# Patient Record
Sex: MALE | Race: WHITE | NOT HISPANIC OR LATINO | Employment: UNEMPLOYED | URBAN - METROPOLITAN AREA
[De-identification: names, ages, dates, MRNs, and addresses within clinical notes are randomized per-mention and may not be internally consistent; named-entity substitution may affect disease eponyms.]

---

## 2020-10-09 ENCOUNTER — OFFICE VISIT (OUTPATIENT)
Dept: URGENT CARE | Facility: CLINIC | Age: 45
End: 2020-10-09
Payer: COMMERCIAL

## 2020-10-09 VITALS
WEIGHT: 175 LBS | DIASTOLIC BLOOD PRESSURE: 88 MMHG | OXYGEN SATURATION: 97 % | HEART RATE: 74 BPM | TEMPERATURE: 97.4 F | BODY MASS INDEX: 23.7 KG/M2 | RESPIRATION RATE: 18 BRPM | HEIGHT: 72 IN | SYSTOLIC BLOOD PRESSURE: 151 MMHG

## 2020-10-09 DIAGNOSIS — L03.115 CELLULITIS OF RIGHT LOWER EXTREMITY: Primary | ICD-10-CM

## 2020-10-09 PROCEDURE — G0382 LEV 3 HOSP TYPE B ED VISIT: HCPCS | Performed by: PHYSICIAN ASSISTANT

## 2020-10-09 RX ORDER — SULFAMETHOXAZOLE AND TRIMETHOPRIM 800; 160 MG/1; MG/1
1 TABLET ORAL EVERY 12 HOURS SCHEDULED
Qty: 20 TABLET | Refills: 0 | Status: SHIPPED | OUTPATIENT
Start: 2020-10-09 | End: 2020-10-19

## 2020-10-14 ENCOUNTER — OFFICE VISIT (OUTPATIENT)
Dept: URGENT CARE | Facility: CLINIC | Age: 45
End: 2020-10-14
Payer: COMMERCIAL

## 2020-10-14 VITALS
SYSTOLIC BLOOD PRESSURE: 144 MMHG | TEMPERATURE: 98 F | HEIGHT: 72 IN | DIASTOLIC BLOOD PRESSURE: 97 MMHG | WEIGHT: 172 LBS | RESPIRATION RATE: 18 BRPM | HEART RATE: 73 BPM | OXYGEN SATURATION: 99 % | BODY MASS INDEX: 23.3 KG/M2

## 2020-10-14 DIAGNOSIS — L03.115 CELLULITIS OF RIGHT LOWER EXTREMITY: Primary | ICD-10-CM

## 2020-10-14 PROCEDURE — G0383 LEV 4 HOSP TYPE B ED VISIT: HCPCS | Performed by: FAMILY MEDICINE

## 2020-10-14 RX ORDER — DOXYCYCLINE 100 MG/1
100 TABLET ORAL 2 TIMES DAILY
Qty: 20 TABLET | Refills: 0 | Status: SHIPPED | OUTPATIENT
Start: 2020-10-14 | End: 2020-10-24

## 2022-04-30 ENCOUNTER — OFFICE VISIT (OUTPATIENT)
Dept: URGENT CARE | Facility: CLINIC | Age: 47
End: 2022-04-30
Payer: COMMERCIAL

## 2022-04-30 VITALS
RESPIRATION RATE: 18 BRPM | SYSTOLIC BLOOD PRESSURE: 130 MMHG | DIASTOLIC BLOOD PRESSURE: 88 MMHG | HEIGHT: 72 IN | TEMPERATURE: 98 F | BODY MASS INDEX: 24.38 KG/M2 | OXYGEN SATURATION: 99 % | HEART RATE: 60 BPM | WEIGHT: 180 LBS

## 2022-04-30 DIAGNOSIS — R30.0 DYSURIA: Primary | ICD-10-CM

## 2022-04-30 LAB
SL AMB  POCT GLUCOSE, UA: ABNORMAL
SL AMB LEUKOCYTE ESTERASE,UA: ABNORMAL
SL AMB POCT BILIRUBIN,UA: ABNORMAL
SL AMB POCT BLOOD,UA: ABNORMAL
SL AMB POCT CLARITY,UA: CLEAR
SL AMB POCT COLOR,UA: ABNORMAL
SL AMB POCT KETONES,UA: ABNORMAL
SL AMB POCT NITRITE,UA: ABNORMAL
SL AMB POCT PH,UA: 5
SL AMB POCT SPECIFIC GRAVITY,UA: 1
SL AMB POCT URINE PROTEIN: ABNORMAL
SL AMB POCT UROBILINOGEN: 0.2

## 2022-04-30 PROCEDURE — 81002 URINALYSIS NONAUTO W/O SCOPE: CPT | Performed by: FAMILY MEDICINE

## 2022-04-30 PROCEDURE — G0382 LEV 3 HOSP TYPE B ED VISIT: HCPCS | Performed by: FAMILY MEDICINE

## 2022-04-30 PROCEDURE — 87086 URINE CULTURE/COLONY COUNT: CPT | Performed by: FAMILY MEDICINE

## 2022-04-30 RX ORDER — TAMSULOSIN HYDROCHLORIDE 0.4 MG/1
0.4 CAPSULE ORAL
COMMUNITY
Start: 2022-04-22 | End: 2022-05-02

## 2022-04-30 RX ORDER — SULFAMETHOXAZOLE AND TRIMETHOPRIM 800; 160 MG/1; MG/1
1 TABLET ORAL EVERY 12 HOURS SCHEDULED
Qty: 14 TABLET | Refills: 0 | Status: SHIPPED | OUTPATIENT
Start: 2022-04-30 | End: 2022-05-07

## 2022-04-30 NOTE — PROGRESS NOTES
[facility]        NAME: Chi Gross is a 42 y o  male  : 1975    MRN: [id number]  DATE: 2022  TIME: 1:57 PM    Assessment and Plan   Dysuria [R30 0]  1  Dysuria  POCT urine dip    Urine culture    sulfamethoxazole-trimethoprim (BACTRIM DS) 800-160 mg per tablet         Patient Instructions   Persistent dysuria after passing kidney stones  Small amount of blood/leukocytes on urinalysis  With pain with urination  In light of finding of  Left-sided mild hydronephrosis and hydroureter  On CT scan in the ED will start antibiotic Bactrim ds-take 1 tablet by mouth twice daily for 7 days  Urine culture will be sent out to confirm  Follow-up with Urology in 2 days as scheduled  Tylenol on as needed basis for pain  Continue hydration  May continue azo on as needed basis  Follow up with PCP in 3-5 days  Proceed to  ER if symptoms worsen  Chief Complaint     Chief Complaint   Patient presents with    Possible UTI     burning on urination for 9 days, passed 8 kidney stones 1 week ago         History of Present Illness         Patient presents for evaluation of persistent dysuria and suprapubic discomfort after developing significant left-sided pain in the flank and abdomen on 2020  Patient was evaluated at a [facility] Emergency Room where she was diagnosed with nephrolithiasis  CT scan of the abdomen and pelvis showed mild left hydronephrosis and hydroureter with evidence of stones  Patient was placed on Flomax as well as ibuprofen and Percocet for pain relief  He was able to pass 9 stone since then  Currently he is drinking plenty of water and does not see any gross blood in the urine  This morning he had persistent dysuria and took some over-the-counter azo which helped a little bit only  Otherwise he is taking ibuprofen  No fevers or chills, no nausea or vomiting  No left-sided flank pain or abdominal pain    No abdominal tenderness  Review of Systems   Review of Systems   Constitutional: Negative for chills, diaphoresis and fever  Genitourinary: Positive for dysuria, frequency, penile pain and urgency  Negative for hematuria and penile discharge  Current Medications       Current Outpatient Medications:     sulfamethoxazole-trimethoprim (BACTRIM DS) 800-160 mg per tablet, Take 1 tablet by mouth every 12 (twelve) hours for 7 days, Disp: 14 tablet, Rfl: 0    tamsulosin (FLOMAX) 0 4 mg, Take 0 4 mg by mouth (Patient not taking: Reported on 4/30/2022 ), Disp: , Rfl:     Current Allergies     Allergies as of 04/30/2022    (No Known Allergies)            The following portions of the patient's history were reviewed and updated as appropriate: allergies, current medications, past family history, past medical history, past social history, past surgical history and problem list      History reviewed  No pertinent past medical history  History reviewed  No pertinent surgical history  History reviewed  No pertinent family history  Medications have been verified  Objective   /88   Pulse 60   Temp 98 °F (36 7 °C) (Temporal)   Resp 18   Ht 6' (1 829 m)   Wt 81 6 kg (180 lb)   SpO2 99%   BMI 24 41 kg/m²   No LMP for male patient  Physical Exam     Physical Exam  Constitutional:       General: He is not in acute distress  Appearance: Normal appearance  He is not toxic-appearing  Cardiovascular:      Rate and Rhythm: Normal rate and regular rhythm  Heart sounds: Normal heart sounds  Pulmonary:      Effort: Pulmonary effort is normal       Breath sounds: Normal breath sounds  No wheezing or rhonchi  Abdominal:      General: Abdomen is flat  Tenderness: There is no abdominal tenderness  There is no right CVA tenderness or left CVA tenderness  Skin:     General: Skin is warm and dry  Coloration: Skin is not jaundiced  Neurological:      Mental Status: He is alert  Urinalysis with orange color, small blood, trace leukocytes  Negative nitrites

## 2022-04-30 NOTE — PATIENT INSTRUCTIONS
Persistent dysuria after passing kidney stones  Small amount of blood/leukocytes on urinalysis  With pain with urination  In light of finding of  Left-sided mild hydronephrosis and hydroureter  On CT scan in the ED will start antibiotic Bactrim ds-take 1 tablet by mouth twice daily for 7 days  Urine culture will be sent out to confirm  Follow-up with Urology in 2 days as scheduled  Tylenol on as needed basis for pain  Continue hydration  May continue azo on as needed basis

## 2022-05-02 LAB — BACTERIA UR CULT: NORMAL

## 2023-08-22 ENCOUNTER — OFFICE VISIT (OUTPATIENT)
Dept: INTERNAL MEDICINE CLINIC | Facility: OTHER | Age: 48
End: 2023-08-22
Payer: COMMERCIAL

## 2023-08-22 VITALS
HEIGHT: 73 IN | SYSTOLIC BLOOD PRESSURE: 138 MMHG | WEIGHT: 176 LBS | DIASTOLIC BLOOD PRESSURE: 80 MMHG | RESPIRATION RATE: 20 BRPM | OXYGEN SATURATION: 97 % | HEART RATE: 82 BPM | BODY MASS INDEX: 23.33 KG/M2 | TEMPERATURE: 98.6 F

## 2023-08-22 DIAGNOSIS — K40.20 NON-RECURRENT BILATERAL INGUINAL HERNIA WITHOUT OBSTRUCTION OR GANGRENE: Primary | ICD-10-CM

## 2023-08-22 DIAGNOSIS — R03.0 ELEVATED BP WITHOUT DIAGNOSIS OF HYPERTENSION: ICD-10-CM

## 2023-08-22 DIAGNOSIS — Z13.228 SCREENING FOR METABOLIC DISORDER: ICD-10-CM

## 2023-08-22 PROCEDURE — 99202 OFFICE O/P NEW SF 15 MIN: CPT | Performed by: NURSE PRACTITIONER

## 2023-08-22 NOTE — ASSESSMENT & PLAN NOTE
Continue to monitor blood pressure at home. Goal BP is < 130/80. Contact our office for consistent elevations. Recommend low sodium diet. Exercise 30 minutes 5 times a week as tolerated.   Recommend yearly eye exam.

## 2023-08-22 NOTE — PROGRESS NOTES
Assessment/Plan:    Non-recurrent bilateral inguinal hernia without obstruction or gangrene  Referral to surgery    Screening for metabolic disorder  Will get fasting blood work    Elevated BP without diagnosis of hypertension    Continue to monitor blood pressure at home. Goal BP is < 130/80. Contact our office for consistent elevations. Recommend low sodium diet. Exercise 30 minutes 5 times a week as tolerated. Recommend yearly eye exam.                Diagnoses and all orders for this visit:    Non-recurrent bilateral inguinal hernia without obstruction or gangrene  -     Ambulatory Referral to General Surgery; Future    Screening for metabolic disorder  -     CBC and differential  -     Comprehensive metabolic panel; Future  -     Lipid panel    Elevated BP without diagnosis of hypertension          Subjective:      Patient ID: Alivia Streeter is a 57 y.o. male. Patient presents a new patient today for concerns for groin pain. He reports about 2 months ago he felt pain to his left groin denies any particular injury that he can recall. He is unable to run or work out due to the discomfort. He reports pain behind his belly button as well. Hurts worse with dipping motion when working out   Started to notice a lump     He reports that as an infant he had a double hernia with repair       Previous CT from 2022  1.   There are no findings of right renal calculus, hydronephrosis or   hydroureter. There is mild left hydronephrosis and hydroureter due to left   ureterovesical junction obstructing Steinstrasse with the largest left   ureterovesical nonobstructing calculus measuring 1.4 mm. There are multiple   nonobstructing left renal calculi with the largest nonobstructing left renal   calculus at the lower pole measuring 1.8 mm. The enlarged prostate gland   measures 5.8 x 4.4 cm.  There is distended urinary bladder representing findings   of urinary bladder outlet obstruction related to enlarged prostate gland which   can be better evaluated with cystoscopy . 2. Che Caper is mucosal wall thickening of the visualized esophagus. The small and   large bowel loops are normal in caliber, and, evaluation of the gastrointestinal   tract is limited due to lack of oral contrast. The appendix is normal. There is   right inguinal hernia containing fat, portion of cecum and small bowel loops   within the hernial sac without findings of interstitial obstruction. 3.  The fat-containing left inguinal hernia measures 2.6 x 2.4 cm. The   fat-containing umbilical hernia measures 1.0 x 0.6 cm.   4.  There are atherosclerotic aortic and vascular calcifications. The following portions of the patient's history were reviewed and updated as appropriate: allergies, current medications, past family history, past medical history, past social history, past surgical history and problem list.    Review of Systems   Constitutional: Negative for activity change, appetite change, chills, diaphoresis and fever. HENT: Negative for congestion, ear discharge, ear pain, postnasal drip, rhinorrhea, sinus pressure, sinus pain and sore throat. Eyes: Negative for pain, discharge, itching and visual disturbance. Respiratory: Negative for cough, chest tightness, shortness of breath and wheezing. Cardiovascular: Negative for chest pain, palpitations and leg swelling. Gastrointestinal: Negative for abdominal pain, constipation, diarrhea, nausea and vomiting. Endocrine: Negative for polydipsia, polyphagia and polyuria. Genitourinary: Negative for difficulty urinating, dysuria and urgency. Musculoskeletal: Negative for arthralgias, back pain and neck pain. Skin: Negative for rash and wound. Neurological: Negative for dizziness, weakness, numbness and headaches.          Past Medical History:   Diagnosis Date   • Kidney stone          Current Outpatient Medications:   •  tamsulosin (FLOMAX) 0.4 mg, Take 0.4 mg by mouth (Patient not taking: Reported on 4/30/2022 ), Disp: , Rfl:     No Known Allergies    Social History   Past Surgical History:   Procedure Laterality Date   • HERNIA REPAIR      as an infant but does not know were     Family History   Problem Relation Age of Onset   • Liver cancer Mother    • Diabetes Cousin        Objective:  /80 (BP Location: Left arm, Patient Position: Sitting, Cuff Size: Standard)   Pulse 82   Temp 98.6 °F (37 °C) (Temporal)   Resp 20   Ht 6' 0.5" (1.842 m)   Wt 79.8 kg (176 lb)   SpO2 97%   BMI 23.54 kg/m²     No results found for this or any previous visit (from the past 1344 hour(s)). Physical Exam  Exam conducted with a chaperone present (exam perfromed by Dr. Chantal Donato ). Constitutional:       General: He is not in acute distress. Appearance: He is well-developed. He is not diaphoretic. HENT:      Head: Normocephalic and atraumatic. Right Ear: External ear normal.      Left Ear: External ear normal.      Nose: Nose normal.      Mouth/Throat:      Pharynx: No oropharyngeal exudate. Eyes:      General:         Right eye: No discharge. Left eye: No discharge. Conjunctiva/sclera: Conjunctivae normal.      Pupils: Pupils are equal, round, and reactive to light. Neck:      Thyroid: No thyromegaly. Cardiovascular:      Rate and Rhythm: Normal rate and regular rhythm. Heart sounds: Normal heart sounds. No murmur heard. No friction rub. No gallop. Pulmonary:      Effort: Pulmonary effort is normal. No respiratory distress. Breath sounds: Normal breath sounds. No stridor. No wheezing or rales. Abdominal:      General: Bowel sounds are normal. There is no distension. Palpations: Abdomen is soft. Tenderness: There is no abdominal tenderness. Hernia: A hernia is present. Hernia is present in the left inguinal area. Musculoskeletal:      Cervical back: Normal range of motion and neck supple.    Lymphadenopathy: Cervical: No cervical adenopathy. Skin:     General: Skin is warm and dry. Findings: No erythema or rash. Neurological:      Mental Status: He is alert and oriented to person, place, and time. Psychiatric:         Behavior: Behavior normal.         Thought Content:  Thought content normal.         Judgment: Judgment normal.

## 2023-09-05 ENCOUNTER — CONSULT (OUTPATIENT)
Dept: SURGERY | Facility: CLINIC | Age: 48
End: 2023-09-05
Payer: COMMERCIAL

## 2023-09-05 VITALS
WEIGHT: 175 LBS | SYSTOLIC BLOOD PRESSURE: 140 MMHG | HEIGHT: 72 IN | DIASTOLIC BLOOD PRESSURE: 88 MMHG | BODY MASS INDEX: 23.7 KG/M2 | HEART RATE: 81 BPM

## 2023-09-05 DIAGNOSIS — K40.20 NON-RECURRENT BILATERAL INGUINAL HERNIA WITHOUT OBSTRUCTION OR GANGRENE: ICD-10-CM

## 2023-09-05 PROCEDURE — 99203 OFFICE O/P NEW LOW 30 MIN: CPT | Performed by: SURGERY

## 2023-09-05 NOTE — PROGRESS NOTES
Assessment/Plan: Patient had CAT scan imaging last year demonstrating bilateral inguinal as well as umbilical herniations. He did have a history of bilateral inguinal hernia repair as an infant. Recently it is the left inguinal area that has become uncomfortable. He has had difficulty with activity over the last 2 months. His pain is daily. It does alter his exercise regimen. He states that he is busy daily either going to the gym or being active at work. He enjoys workouts as well as running regularly. Initially we discussed robotic assisted laparoscopic bilateral inguinal hernia repair. We discussed that the recurrence rate of 5%. An open hernia repair rate is 1% or less. After discussion, his preference is to repair the left inguinal symptomatic hernia in an open fashion as he has concerns with his active lifestyle of recurrence. Risks and benefits of surgery discussed in detail. All questions answered. The umbilical and right inguinal regions are presently asymptomatic. Diagnoses and all orders for this visit:    Non-recurrent bilateral inguinal hernia without obstruction or gangrene  -     Ambulatory Referral to General Surgery        Subjective:      Patient ID: German Moyer is a 57 y.o. male. Patient presents for bilateral inguinal hernias and umbilical hernia consult. States he has had bulges RLQ, LLQ and at his umbilicus for 2 months. He has dull achy pain LLQ. Limits his activities. CTA abdomen pelvis 4/22/2022. IMPRESSION:   1.   There are no findings of right renal calculus, hydronephrosis or   hydroureter. There is mild left hydronephrosis and hydroureter due to left   ureterovesical junction obstructing Steinstrasse with the largest left   ureterovesical nonobstructing calculus measuring 1.4 mm. There are multiple   nonobstructing left renal calculi with the largest nonobstructing left renal   calculus at the lower pole measuring 1.8 mm.  The enlarged prostate gland measures 5.8 x 4.4 cm. There is distended urinary bladder representing findings   of urinary bladder outlet obstruction related to enlarged prostate gland which   can be better evaluated with cystoscopy . 2. Darlean Pouch is mucosal wall thickening of the visualized esophagus. The small and   large bowel loops are normal in caliber, and, evaluation of the gastrointestinal   tract is limited due to lack of oral contrast. The appendix is normal. There is   right inguinal hernia containing fat, portion of cecum and small bowel loops   within the hernial sac without findings of interstitial obstruction. 3.  The fat-containing left inguinal hernia measures 2.6 x 2.4 cm. The   fat-containing umbilical hernia measures 1.0 x 0.6 cm.   4.  There are atherosclerotic aortic and vascular calcifications. The following portions of the patient's history were reviewed and updated as appropriate:     He  has a past medical history of Kidney stone. He  has a past surgical history that includes Hernia repair. His family history includes Diabetes in his cousin; Liver cancer in his mother. He  reports that he quit smoking about 18 months ago. His smoking use included cigarettes. He has never used smokeless tobacco. He reports current alcohol use. He reports current drug use. Drug: Marijuana. Current Outpatient Medications   Medication Sig Dispense Refill   • tamsulosin (FLOMAX) 0.4 mg Take 0.4 mg by mouth (Patient not taking: Reported on 4/30/2022 )       No current facility-administered medications for this visit. He has No Known Allergies. .    Review of Systems   Constitutional: Negative. Negative for activity change. HENT: Negative. Eyes: Negative. Respiratory: Negative. Cardiovascular: Negative. Gastrointestinal: Positive for abdominal pain. Endocrine: Negative. Genitourinary: Negative. Musculoskeletal: Negative. Skin: Negative. Allergic/Immunologic: Negative. Neurological: Negative. Psychiatric/Behavioral: Negative for agitation, behavioral problems and confusion. The patient is not nervous/anxious. Objective:      /88   Pulse 81   Ht 6' (1.829 m)   Wt 79.4 kg (175 lb)   BMI 23.73 kg/m²          Physical Exam  Constitutional:       Appearance: He is well-developed. He is not diaphoretic. HENT:      Head: Normocephalic and atraumatic. Eyes:      General: No scleral icterus. Right eye: No discharge. Left eye: No discharge. Extraocular Movements: Extraocular movements intact. Conjunctiva/sclera: Conjunctivae normal.   Neck:      Thyroid: No thyromegaly. Trachea: No tracheal deviation. Cardiovascular:      Rate and Rhythm: Normal rate and regular rhythm. Heart sounds: Normal heart sounds. No murmur heard. No friction rub. Pulmonary:      Effort: Pulmonary effort is normal. No respiratory distress. Breath sounds: Normal breath sounds. No stridor. No wheezing. Chest:      Chest wall: No tenderness. Abdominal:      General: There is no distension. Palpations: There is no mass. Tenderness: There is no abdominal tenderness. There is no guarding or rebound. Hernia: A hernia (  Left and right inguinal herniations. These are reducible. Very small umbilical hernia to deep palpation.) is present. Musculoskeletal:         General: No tenderness. Right lower leg: No edema. Left lower leg: No edema. Lymphadenopathy:      Cervical: No cervical adenopathy. Skin:     General: Skin is warm and dry. Findings: No erythema or rash. Neurological:      Mental Status: He is alert and oriented to person, place, and time. Cranial Nerves: No cranial nerve deficit.       Coordination: Coordination normal.   Psychiatric:         Behavior: Behavior normal.         Judgment: Judgment normal.

## 2023-09-05 NOTE — H&P (VIEW-ONLY)
Assessment/Plan: Patient had CAT scan imaging last year demonstrating bilateral inguinal as well as umbilical herniations. He did have a history of bilateral inguinal hernia repair as an infant. Recently it is the left inguinal area that has become uncomfortable. He has had difficulty with activity over the last 2 months. His pain is daily. It does alter his exercise regimen. He states that he is busy daily either going to the gym or being active at work. He enjoys workouts as well as running regularly. Initially we discussed robotic assisted laparoscopic bilateral inguinal hernia repair. We discussed that the recurrence rate of 5%. An open hernia repair rate is 1% or less. After discussion, his preference is to repair the left inguinal symptomatic hernia in an open fashion as he has concerns with his active lifestyle of recurrence. Risks and benefits of surgery discussed in detail. All questions answered. The umbilical and right inguinal regions are presently asymptomatic. Diagnoses and all orders for this visit:    Non-recurrent bilateral inguinal hernia without obstruction or gangrene  -     Ambulatory Referral to General Surgery        Subjective:      Patient ID: Kayla Milligan is a 57 y.o. male. Patient presents for bilateral inguinal hernias and umbilical hernia consult. States he has had bulges RLQ, LLQ and at his umbilicus for 2 months. He has dull achy pain LLQ. Limits his activities. CTA abdomen pelvis 4/22/2022. IMPRESSION:   1.   There are no findings of right renal calculus, hydronephrosis or   hydroureter. There is mild left hydronephrosis and hydroureter due to left   ureterovesical junction obstructing Steinstrasse with the largest left   ureterovesical nonobstructing calculus measuring 1.4 mm. There are multiple   nonobstructing left renal calculi with the largest nonobstructing left renal   calculus at the lower pole measuring 1.8 mm.  The enlarged prostate gland measures 5.8 x 4.4 cm. There is distended urinary bladder representing findings   of urinary bladder outlet obstruction related to enlarged prostate gland which   can be better evaluated with cystoscopy . 2. Annamae Heater is mucosal wall thickening of the visualized esophagus. The small and   large bowel loops are normal in caliber, and, evaluation of the gastrointestinal   tract is limited due to lack of oral contrast. The appendix is normal. There is   right inguinal hernia containing fat, portion of cecum and small bowel loops   within the hernial sac without findings of interstitial obstruction. 3.  The fat-containing left inguinal hernia measures 2.6 x 2.4 cm. The   fat-containing umbilical hernia measures 1.0 x 0.6 cm.   4.  There are atherosclerotic aortic and vascular calcifications. The following portions of the patient's history were reviewed and updated as appropriate:     He  has a past medical history of Kidney stone. He  has a past surgical history that includes Hernia repair. His family history includes Diabetes in his cousin; Liver cancer in his mother. He  reports that he quit smoking about 18 months ago. His smoking use included cigarettes. He has never used smokeless tobacco. He reports current alcohol use. He reports current drug use. Drug: Marijuana. Current Outpatient Medications   Medication Sig Dispense Refill   • tamsulosin (FLOMAX) 0.4 mg Take 0.4 mg by mouth (Patient not taking: Reported on 4/30/2022 )       No current facility-administered medications for this visit. He has No Known Allergies. .    Review of Systems   Constitutional: Negative. Negative for activity change. HENT: Negative. Eyes: Negative. Respiratory: Negative. Cardiovascular: Negative. Gastrointestinal: Positive for abdominal pain. Endocrine: Negative. Genitourinary: Negative. Musculoskeletal: Negative. Skin: Negative. Allergic/Immunologic: Negative. Neurological: Negative. Psychiatric/Behavioral: Negative for agitation, behavioral problems and confusion. The patient is not nervous/anxious. Objective:      /88   Pulse 81   Ht 6' (1.829 m)   Wt 79.4 kg (175 lb)   BMI 23.73 kg/m²          Physical Exam  Constitutional:       Appearance: He is well-developed. He is not diaphoretic. HENT:      Head: Normocephalic and atraumatic. Eyes:      General: No scleral icterus. Right eye: No discharge. Left eye: No discharge. Extraocular Movements: Extraocular movements intact. Conjunctiva/sclera: Conjunctivae normal.   Neck:      Thyroid: No thyromegaly. Trachea: No tracheal deviation. Cardiovascular:      Rate and Rhythm: Normal rate and regular rhythm. Heart sounds: Normal heart sounds. No murmur heard. No friction rub. Pulmonary:      Effort: Pulmonary effort is normal. No respiratory distress. Breath sounds: Normal breath sounds. No stridor. No wheezing. Chest:      Chest wall: No tenderness. Abdominal:      General: There is no distension. Palpations: There is no mass. Tenderness: There is no abdominal tenderness. There is no guarding or rebound. Hernia: A hernia (  Left and right inguinal herniations. These are reducible. Very small umbilical hernia to deep palpation.) is present. Musculoskeletal:         General: No tenderness. Right lower leg: No edema. Left lower leg: No edema. Lymphadenopathy:      Cervical: No cervical adenopathy. Skin:     General: Skin is warm and dry. Findings: No erythema or rash. Neurological:      Mental Status: He is alert and oriented to person, place, and time. Cranial Nerves: No cranial nerve deficit.       Coordination: Coordination normal.   Psychiatric:         Behavior: Behavior normal.         Judgment: Judgment normal.

## 2023-09-08 ENCOUNTER — ANESTHESIA EVENT (OUTPATIENT)
Dept: PERIOP | Facility: AMBULARY SURGERY CENTER | Age: 48
End: 2023-09-08
Payer: COMMERCIAL

## 2023-09-18 NOTE — PRE-PROCEDURE INSTRUCTIONS
No outpatient medications have been marked as taking for the 9/21/23 encounter [facility] HOSPITAL Encounter). Spoke with pt via phone. Medication instructions for day surgery reviewed. Please use only a sip of water to take your instructed medications. Avoid all over the counter vitamins, supplements and NSAIDS for one week prior to surgery per anesthesia guidelines. Tylenol is ok to take as needed. You will receive a call one business day prior to surgery with an arrival time and hospital directions. If your surgery is scheduled on a Monday, the hospital will be calling you on the Friday prior to your surgery. If you have not heard from anyone by 8pm, please call the hospital supervisor through the hospital  at [phone]. Jyoti Keller [phone]). Do not eat or drink anything after midnight the night before your surgery, including candy, mints, lifesavers, or chewing gum. Do not drink alcohol 24hrs before your surgery. Try not to smoke at least 24hrs before your surgery. Follow the pre surgery showering instructions as listed in the [facility] Surgical Experience Booklet” or otherwise provided by your surgeon's office. Do not shave the surgical area 24 hours before surgery. Do not apply any lotions, creams, including makeup, cologne, deodorant, or perfumes after showering on the day of your surgery. No contact lenses, eye make-up, or artificial eyelashes. Remove nail polish, including gel polish, and any artificial, gel, or acrylic nails if possible. Remove all jewelry including rings and body piercing jewelry. Wear causal clothing that is easy to take on and off. Consider your type of surgery. Keep any valuables, jewelry, piercings at home. Please bring any specially ordered equipment (sling, braces) if indicated. Arrange for a responsible person to drive you to and from the hospital on the day of your surgery. Visitor Guidelines discussed.      Call the surgeon's office with any new illnesses, exposures, or additional questions prior to surgery. Please reference your [facility] Surgical Experience Booklet” for additional information to prepare for your upcoming surgery.

## 2023-09-21 ENCOUNTER — ANESTHESIA (OUTPATIENT)
Dept: PERIOP | Facility: AMBULARY SURGERY CENTER | Age: 48
End: 2023-09-21
Payer: COMMERCIAL

## 2023-09-21 ENCOUNTER — HOSPITAL ENCOUNTER (OUTPATIENT)
Facility: AMBULARY SURGERY CENTER | Age: 48
Setting detail: OUTPATIENT SURGERY
Discharge: HOME | End: 2023-09-21
Attending: SURGERY | Admitting: SURGERY
Payer: COMMERCIAL

## 2023-09-21 VITALS
BODY MASS INDEX: 23.7 KG/M2 | DIASTOLIC BLOOD PRESSURE: 73 MMHG | OXYGEN SATURATION: 95 % | HEIGHT: 72 IN | WEIGHT: 175 LBS | TEMPERATURE: 97 F | HEART RATE: 58 BPM | RESPIRATION RATE: 16 BRPM | SYSTOLIC BLOOD PRESSURE: 129 MMHG

## 2023-09-21 DIAGNOSIS — K40.20 NON-RECURRENT BILATERAL INGUINAL HERNIA WITHOUT OBSTRUCTION OR GANGRENE: Primary | ICD-10-CM

## 2023-09-21 PROBLEM — F17.200 SMOKER: Status: ACTIVE | Noted: 2023-09-21

## 2023-09-21 PROCEDURE — 49505 PRP I/HERN INIT REDUC >5 YR: CPT | Performed by: SURGERY

## 2023-09-21 PROCEDURE — C1781 MESH (IMPLANTABLE): HCPCS | Performed by: SURGERY

## 2023-09-21 DEVICE — MODIFIED ONFLEX MESH
Type: IMPLANTABLE DEVICE | Site: INGUINAL | Status: FUNCTIONAL
Brand: MODIFIED ONFLEX MESH

## 2023-09-21 RX ORDER — FENTANYL CITRATE/PF 50 MCG/ML
25 SYRINGE (ML) INJECTION
Status: COMPLETED | OUTPATIENT
Start: 2023-09-21 | End: 2023-09-21

## 2023-09-21 RX ORDER — OXYCODONE HYDROCHLORIDE AND ACETAMINOPHEN 5; 325 MG/1; MG/1
1 TABLET ORAL EVERY 4 HOURS PRN
Qty: 10 TABLET | Refills: 0 | Status: SHIPPED | OUTPATIENT
Start: 2023-09-21

## 2023-09-21 RX ORDER — HYDROMORPHONE HCL/PF 1 MG/ML
0.5 SYRINGE (ML) INJECTION
Status: DISCONTINUED | OUTPATIENT
Start: 2023-09-21 | End: 2023-09-21 | Stop reason: HOSPADM

## 2023-09-21 RX ORDER — ONDANSETRON 2 MG/ML
4 INJECTION INTRAMUSCULAR; INTRAVENOUS ONCE AS NEEDED
Status: DISCONTINUED | OUTPATIENT
Start: 2023-09-21 | End: 2023-09-21 | Stop reason: HOSPADM

## 2023-09-21 RX ORDER — BUPIVACAINE HYDROCHLORIDE 2.5 MG/ML
INJECTION, SOLUTION EPIDURAL; INFILTRATION; INTRACAUDAL AS NEEDED
Status: DISCONTINUED | OUTPATIENT
Start: 2023-09-21 | End: 2023-09-21 | Stop reason: HOSPADM

## 2023-09-21 RX ORDER — FENTANYL CITRATE 50 UG/ML
INJECTION, SOLUTION INTRAMUSCULAR; INTRAVENOUS AS NEEDED
Status: DISCONTINUED | OUTPATIENT
Start: 2023-09-21 | End: 2023-09-21

## 2023-09-21 RX ORDER — OXYCODONE HYDROCHLORIDE AND ACETAMINOPHEN 5; 325 MG/1; MG/1
1 TABLET ORAL EVERY 4 HOURS PRN
Status: DISCONTINUED | OUTPATIENT
Start: 2023-09-21 | End: 2023-09-21 | Stop reason: HOSPADM

## 2023-09-21 RX ORDER — SUCCINYLCHOLINE/SOD CL,ISO/PF 100 MG/5ML
SYRINGE (ML) INTRAVENOUS AS NEEDED
Status: DISCONTINUED | OUTPATIENT
Start: 2023-09-21 | End: 2023-09-21

## 2023-09-21 RX ORDER — KETOROLAC TROMETHAMINE 30 MG/ML
INJECTION, SOLUTION INTRAMUSCULAR; INTRAVENOUS AS NEEDED
Status: DISCONTINUED | OUTPATIENT
Start: 2023-09-21 | End: 2023-09-21

## 2023-09-21 RX ORDER — SODIUM CHLORIDE, SODIUM LACTATE, POTASSIUM CHLORIDE, CALCIUM CHLORIDE 600; 310; 30; 20 MG/100ML; MG/100ML; MG/100ML; MG/100ML
125 INJECTION, SOLUTION INTRAVENOUS CONTINUOUS
Status: DISCONTINUED | OUTPATIENT
Start: 2023-09-21 | End: 2023-09-21 | Stop reason: HOSPADM

## 2023-09-21 RX ORDER — ONDANSETRON 2 MG/ML
INJECTION INTRAMUSCULAR; INTRAVENOUS AS NEEDED
Status: DISCONTINUED | OUTPATIENT
Start: 2023-09-21 | End: 2023-09-21

## 2023-09-21 RX ORDER — PROPOFOL 10 MG/ML
INJECTION, EMULSION INTRAVENOUS AS NEEDED
Status: DISCONTINUED | OUTPATIENT
Start: 2023-09-21 | End: 2023-09-21

## 2023-09-21 RX ORDER — ONDANSETRON 2 MG/ML
4 INJECTION INTRAMUSCULAR; INTRAVENOUS EVERY 4 HOURS PRN
Status: DISCONTINUED | OUTPATIENT
Start: 2023-09-21 | End: 2023-09-21 | Stop reason: HOSPADM

## 2023-09-21 RX ORDER — LIDOCAINE HYDROCHLORIDE 10 MG/ML
INJECTION, SOLUTION EPIDURAL; INFILTRATION; INTRACAUDAL; PERINEURAL AS NEEDED
Status: DISCONTINUED | OUTPATIENT
Start: 2023-09-21 | End: 2023-09-21

## 2023-09-21 RX ORDER — KETAMINE HCL IN NACL, ISO-OSM 100MG/10ML
SYRINGE (ML) INJECTION AS NEEDED
Status: DISCONTINUED | OUTPATIENT
Start: 2023-09-21 | End: 2023-09-21

## 2023-09-21 RX ORDER — DEXAMETHASONE SODIUM PHOSPHATE 10 MG/ML
INJECTION, SOLUTION INTRAMUSCULAR; INTRAVENOUS AS NEEDED
Status: DISCONTINUED | OUTPATIENT
Start: 2023-09-21 | End: 2023-09-21

## 2023-09-21 RX ORDER — GLYCOPYRROLATE 0.2 MG/ML
INJECTION INTRAMUSCULAR; INTRAVENOUS AS NEEDED
Status: DISCONTINUED | OUTPATIENT
Start: 2023-09-21 | End: 2023-09-21

## 2023-09-21 RX ORDER — CEFAZOLIN SODIUM 2 G/50ML
2000 SOLUTION INTRAVENOUS ONCE
Status: COMPLETED | OUTPATIENT
Start: 2023-09-21 | End: 2023-09-21

## 2023-09-21 RX ORDER — MAGNESIUM HYDROXIDE 1200 MG/15ML
LIQUID ORAL AS NEEDED
Status: DISCONTINUED | OUTPATIENT
Start: 2023-09-21 | End: 2023-09-21 | Stop reason: HOSPADM

## 2023-09-21 RX ORDER — SODIUM CHLORIDE, SODIUM LACTATE, POTASSIUM CHLORIDE, CALCIUM CHLORIDE 600; 310; 30; 20 MG/100ML; MG/100ML; MG/100ML; MG/100ML
INJECTION, SOLUTION INTRAVENOUS CONTINUOUS PRN
Status: DISCONTINUED | OUTPATIENT
Start: 2023-09-21 | End: 2023-09-21

## 2023-09-21 RX ORDER — MIDAZOLAM HYDROCHLORIDE 2 MG/2ML
INJECTION, SOLUTION INTRAMUSCULAR; INTRAVENOUS AS NEEDED
Status: DISCONTINUED | OUTPATIENT
Start: 2023-09-21 | End: 2023-09-21

## 2023-09-21 RX ORDER — FUROSEMIDE 10 MG/ML
INJECTION INTRAMUSCULAR; INTRAVENOUS AS NEEDED
Status: DISCONTINUED | OUTPATIENT
Start: 2023-09-21 | End: 2023-09-21

## 2023-09-21 RX ORDER — ACETAMINOPHEN 325 MG/1
650 TABLET ORAL EVERY 4 HOURS PRN
Status: DISCONTINUED | OUTPATIENT
Start: 2023-09-21 | End: 2023-09-21 | Stop reason: HOSPADM

## 2023-09-21 RX ADMIN — SODIUM CHLORIDE, SODIUM LACTATE, POTASSIUM CHLORIDE, AND CALCIUM CHLORIDE: .6; .31; .03; .02 INJECTION, SOLUTION INTRAVENOUS at 12:23

## 2023-09-21 RX ADMIN — FENTANYL CITRATE 25 MCG: 50 INJECTION INTRAMUSCULAR; INTRAVENOUS at 13:46

## 2023-09-21 RX ADMIN — CEFAZOLIN SODIUM 2000 MG: 2 SOLUTION INTRAVENOUS at 11:30

## 2023-09-21 RX ADMIN — SODIUM CHLORIDE, SODIUM LACTATE, POTASSIUM CHLORIDE, AND CALCIUM CHLORIDE: .6; .31; .03; .02 INJECTION, SOLUTION INTRAVENOUS at 11:08

## 2023-09-21 RX ADMIN — FENTANYL CITRATE 25 MCG: 50 INJECTION INTRAMUSCULAR; INTRAVENOUS at 13:41

## 2023-09-21 RX ADMIN — FENTANYL CITRATE 25 MCG: 50 INJECTION INTRAMUSCULAR; INTRAVENOUS at 11:58

## 2023-09-21 RX ADMIN — FENTANYL CITRATE 25 MCG: 50 INJECTION INTRAMUSCULAR; INTRAVENOUS at 13:51

## 2023-09-21 RX ADMIN — PROPOFOL 100 MG: 10 INJECTION, EMULSION INTRAVENOUS at 13:00

## 2023-09-21 RX ADMIN — FENTANYL CITRATE 50 MCG: 50 INJECTION INTRAMUSCULAR; INTRAVENOUS at 11:34

## 2023-09-21 RX ADMIN — Medication 30 MG: at 11:43

## 2023-09-21 RX ADMIN — FENTANYL CITRATE 25 MCG: 50 INJECTION INTRAMUSCULAR; INTRAVENOUS at 13:36

## 2023-09-21 RX ADMIN — MIDAZOLAM 2 MG: 1 INJECTION INTRAMUSCULAR; INTRAVENOUS at 11:30

## 2023-09-21 RX ADMIN — KETOROLAC TROMETHAMINE 15 MG: 30 INJECTION, SOLUTION INTRAMUSCULAR at 12:58

## 2023-09-21 RX ADMIN — OXYCODONE HYDROCHLORIDE AND ACETAMINOPHEN 1 TABLET: 5; 325 TABLET ORAL at 14:42

## 2023-09-21 RX ADMIN — DEXAMETHASONE SODIUM PHOSPHATE 10 MG: 10 INJECTION, SOLUTION INTRAMUSCULAR; INTRAVENOUS at 11:40

## 2023-09-21 RX ADMIN — FENTANYL CITRATE 50 MCG: 50 INJECTION INTRAMUSCULAR; INTRAVENOUS at 11:36

## 2023-09-21 RX ADMIN — FENTANYL CITRATE 25 MCG: 50 INJECTION INTRAMUSCULAR; INTRAVENOUS at 12:02

## 2023-09-21 RX ADMIN — FENTANYL CITRATE 25 MCG: 50 INJECTION INTRAMUSCULAR; INTRAVENOUS at 11:52

## 2023-09-21 RX ADMIN — Medication 100 MG: at 12:47

## 2023-09-21 RX ADMIN — ONDANSETRON 4 MG: 2 INJECTION INTRAMUSCULAR; INTRAVENOUS at 12:39

## 2023-09-21 RX ADMIN — LIDOCAINE HYDROCHLORIDE 50 MG: 10 INJECTION, SOLUTION EPIDURAL; INFILTRATION; INTRACAUDAL; PERINEURAL at 11:36

## 2023-09-21 RX ADMIN — Medication 20 MG: at 11:58

## 2023-09-21 RX ADMIN — GLYCOPYRROLATE 0.2 MCG: 0.2 INJECTION INTRAMUSCULAR; INTRAVENOUS at 11:40

## 2023-09-21 RX ADMIN — PROPOFOL 250 MG: 10 INJECTION, EMULSION INTRAVENOUS at 11:36

## 2023-09-21 RX ADMIN — FUROSEMIDE 5 MG: 10 INJECTION, SOLUTION INTRAMUSCULAR; INTRAVENOUS at 12:40

## 2023-09-21 RX ADMIN — PROPOFOL 100 MG: 10 INJECTION, EMULSION INTRAVENOUS at 12:47

## 2023-09-21 RX ADMIN — FENTANYL CITRATE 25 MCG: 50 INJECTION INTRAMUSCULAR; INTRAVENOUS at 11:55

## 2023-09-21 NOTE — INTERVAL H&P NOTE
H&P reviewed. After examining the patient I find no changes in the patients condition since the H&P had been written.     Vitals:    09/21/23 0926   BP: 122/75   Pulse: 62   Resp: 18   Temp: 97.6 °F (36.4 °C)   SpO2: 96%

## 2023-09-21 NOTE — OP NOTE
OPERATIVE REPORT  PATIENT NAME: Cindy Diaz :  1975  MRN: [id number]  Pt Location: [facility] OR ROOM 05    SURGERY DATE: 2023    Surgeon(s) and Role:     * Brenda Rivera MD - Primary     * Abby Carvajal DO - Assisting    Preop Diagnosis:  Inguinal hernia [K40.90]    Post-Op Diagnosis Codes:     * Inguinal hernia [K40.90]    Procedure(s):  Left - REPAIR HERNIA INGUINAL with mesh    Specimen(s):  * No specimens in log *    Estimated Blood Loss:   Minimal    Drains:  * No LDAs found *    Anesthesia Type:   General    Operative Indications:  Inguinal hernia [K40.90]    Independent, non-smoker, ASA 2, wound class I, BMI 24, weight 175, height 72  PULMONARY   (+) Smoker       Other   (+) Elevated BP without diagnosis of hypertension   (+) Non-recurrent bilateral inguinal hernia without obstruction or gangrene           Complications:   None    Procedure and Technique:  Cindy Diaz is a 57 y.o. male was brought into the operative suite and identified visually and by arm band. The patient was placed in the supine position. Careful attention was made towards padding and positioning of the extremities. After sterile prep and drape, a timeout was completed. The prep was dry. Antibiotics were provided. Athrombic pumps in place. 0.25% Marcaine with epinephrine was utilized throughout the procedure. An incision was made  within the left inguinal region. The incision was carried down through the skin and subcutaneous tissue. The superficial epigastric vein was clamped, ligated and  divided. . The external oblique fibers were identified. The external ring was identified. The external oblique fibers were then split in the direction of their fibers. Hemostats were placed on the cut edges. A self-retaining retractor was then placed. Exploration of the inguinal canal commenced. The cremasteric fibers were then divided along the fiber direction of its fibers the cord structures. The cord was encircled in a atraumatic Penrose drain and preserved during dissection. Identification of a direct inguinal hernia was performed. High dissection was completed at the level of the internal ring. Preperitoneal dissection commenced identifying and preserving the inferior epigastric vessels. A preperitoneal mesh was then inserted. The branch of the genitofemoral nerve was divided in order to help prevent postoperative neuralgia. The inguinal floor was then repaired with interrupted figure-of-eight 0 Vicryl suture. The indirect inguinal ring was repositioned more superiorly while repairing the inguinal transversalis muscular floor. An onlay mesh was then secured to the tendon overlying the lateral pubic tubercle The external oblique fascia was closed with a running 3-0 PDS suture. Additional 0.25% Marcaine with epinephrine was injected over the ilioinguinal and iliohypogastric nerves. 3-0 Vicryl subcutaneous suture was placed into the Fadi's fascia . 4-0 Monocryl suture was used for the subcuticular layer. Dermabond was then applied. The patient was then awakened from general anesthesia and transferred to the recovery room in stable condition. Sponge and instrument count correct ×2. I was present for the entire procedure.     Patient Disposition:  PACU         SIGNATURE: Brenda Rivera MD  DATE: September 21, 2023  TIME: 1:04 PM

## 2023-09-21 NOTE — ANESTHESIA POSTPROCEDURE EVALUATION
Post-Op Assessment Note    CV Status:  Stable  Pain Score: 0    Pain management: adequate     Mental Status:  Alert   Hydration Status:  Stable   PONV Controlled:  None   Airway Patency:  Patent      Post Op Vitals Reviewed: Yes      Staff: CRNA         No notable events documented.     /81 (09/21/23 1319)    Temp 98.2 °F (36.8 °C) (09/21/23 1319)    Pulse 81 (09/21/23 1319)   Resp 16 (09/21/23 1319)    SpO2 100 % (09/21/23 1319)

## 2023-09-21 NOTE — DISCHARGE INSTR - AVS FIRST PAGE
Please call the office when you leave to schedule an appointment for 2 weeks. Please call [phone]. [address], [address], [place]. Off of [address] between [facility] and [facility]. Activity:    May lift 10 lb as many times as desired the 1st week,       20 lb in 2 weeks,       30 lb in 3 weeks. Walking is encouraged  Normal daily activities including climbing steps are okay  Do not engage in strenuous activity ( sit-ups or crutches) or contact sports for 4-6 weeks post-operatively    Return to Work:   Okay to return to work when you feel well if you desire. Diet:   You may return to your normal healthy diet. Wound Care: Your wound is closed with dissolvable stitches and glue. It is okay to shower. Wash incision gently with soap and water and pat dry. Do not soak incisions in bath water or swim for two weeks. Do not apply any creams or ointments. Pain Medication:   Please take as directed if needed. May use Advil or Motrin in addition. Recall, the pain medicine and anesthesia is associated with constipation. No driving while taking narcotic pain medications. Other: It is normal to developed a “healing ridge” / firm incision after surgery. This is your body making scar tissue. It is a good sign  Constipation is very common after general anesthesia. Please use milk of magnesia as needed in order to help prevent constipation. It is normal to get bruising after surgery. If you have questions after discharge please call the office.     If you have increased pain, fever >101.5, increased drainage, redness or a bad smell at your surgery site, please call us immediately or come directly to the Emergency Room

## 2023-09-21 NOTE — ANESTHESIA PREPROCEDURE EVALUATION
Procedure:  REPAIR HERNIA INGUINAL (Left: Groin)    Relevant Problems   PULMONARY   (+) Smoker      Other   (+) Elevated BP without diagnosis of hypertension   (+) Non-recurrent bilateral inguinal hernia without obstruction or gangrene        Physical Exam    Airway    Mallampati score: I  TM Distance: >3 FB  Neck ROM: full     Dental   No notable dental hx     Cardiovascular      Pulmonary      Other Findings        Anesthesia Plan  ASA Score- 2     Anesthesia Type- general with ASA Monitors. Additional Monitors:   Airway Plan: LMA. Plan Factors-Exercise tolerance (METS): >4 METS. Chart reviewed. EKG reviewed. Imaging results reviewed. Existing labs reviewed. Patient summary reviewed. Induction- intravenous. Postoperative Plan-     Informed Consent- Anesthetic plan and risks discussed with patient. I personally reviewed this patient with the CRNA. Discussed and agreed on the Anesthesia Plan with the CRNA. Joselin Hou

## 2024-04-08 ENCOUNTER — TELEPHONE (OUTPATIENT)
Age: 49
End: 2024-04-08

## 2025-03-04 ENCOUNTER — APPOINTMENT (OUTPATIENT)
Dept: URGENT CARE | Facility: CLINIC | Age: 50
End: 2025-03-04

## (undated) DEVICE — PENCIL ELECTROSURG E-Z CLEAN -0035H

## (undated) DEVICE — DECANTER: Brand: UNBRANDED

## (undated) DEVICE — SUT VICRYL 2-0 SH 27 IN UNDYED J417H

## (undated) DEVICE — SUT VICRYL 3-0 SH 27 IN J416H

## (undated) DEVICE — TOWEL SURG XR DETECT GREEN STRL RFD

## (undated) DEVICE — SUT VICRYL 1 CT-1 27 IN J261H

## (undated) DEVICE — SPONGE STICK WITH PVP-I: Brand: KENDALL

## (undated) DEVICE — SUT PDS II 3-0 SH 27 IN Z316H

## (undated) DEVICE — NEEDLE 22 G X 1 1/2 SAFETY

## (undated) DEVICE — UNDYED BRAIDED (POLYGLACTIN 910), SYNTHETIC ABSORBABLE SUTURE: Brand: COATED VICRYL

## (undated) DEVICE — GLOVE SRG BIOGEL ECLIPSE 7

## (undated) DEVICE — SUT VICRYL 2-0 REEL 54 IN J286G

## (undated) DEVICE — INTENDED FOR TISSUE SEPARATION, AND OTHER PROCEDURES THAT REQUIRE A SHARP SURGICAL BLADE TO PUNCTURE OR CUT.: Brand: BARD-PARKER SAFETY BLADES SIZE 15, STERILE

## (undated) DEVICE — SUT MONOCRYL 4-0 PS-2 18 IN Y496G

## (undated) DEVICE — ADHESIVE SKIN HIGH VISCOSITY EXOFIN 1ML

## (undated) DEVICE — ELECTRODE BLADE MOD E-Z CLEAN  2.75IN 7CM -0012AM

## (undated) DEVICE — BETHLEHEM UNIVERSAL MINOR GEN: Brand: CARDINAL HEALTH